# Patient Record
Sex: FEMALE | Race: WHITE | NOT HISPANIC OR LATINO | URBAN - NONMETROPOLITAN AREA
[De-identification: names, ages, dates, MRNs, and addresses within clinical notes are randomized per-mention and may not be internally consistent; named-entity substitution may affect disease eponyms.]

---

## 2024-06-26 ENCOUNTER — OFFICE (OUTPATIENT)
Dept: URBAN - NONMETROPOLITAN AREA CLINIC 1 | Facility: CLINIC | Age: 59
End: 2024-06-26
Payer: COMMERCIAL

## 2024-06-26 VITALS
DIASTOLIC BLOOD PRESSURE: 80 MMHG | HEART RATE: 61 BPM | SYSTOLIC BLOOD PRESSURE: 122 MMHG | WEIGHT: 172 LBS | HEIGHT: 63 IN

## 2024-06-26 DIAGNOSIS — K76.0 FATTY (CHANGE OF) LIVER, NOT ELSEWHERE CLASSIFIED: ICD-10-CM

## 2024-06-26 DIAGNOSIS — R79.89 OTHER SPECIFIED ABNORMAL FINDINGS OF BLOOD CHEMISTRY: ICD-10-CM

## 2024-06-26 DIAGNOSIS — K86.2 CYST OF PANCREAS: ICD-10-CM

## 2024-06-26 DIAGNOSIS — Z80.0 FAMILY HISTORY OF MALIGNANT NEOPLASM OF DIGESTIVE ORGANS: ICD-10-CM

## 2024-06-26 PROCEDURE — 36415 COLL VENOUS BLD VENIPUNCTURE: CPT | Performed by: NURSE PRACTITIONER

## 2024-06-26 PROCEDURE — 99204 OFFICE O/P NEW MOD 45 MIN: CPT | Performed by: NURSE PRACTITIONER

## 2024-07-01 ENCOUNTER — OFFICE (OUTPATIENT)
Dept: URBAN - NONMETROPOLITAN AREA CLINIC 1 | Facility: CLINIC | Age: 59
End: 2024-07-01

## 2024-07-01 VITALS — HEIGHT: 63 IN

## 2024-07-01 DIAGNOSIS — K76.0 FATTY (CHANGE OF) LIVER, NOT ELSEWHERE CLASSIFIED: ICD-10-CM

## 2024-07-01 DIAGNOSIS — R94.5 ABNORMAL RESULTS OF LIVER FUNCTION STUDIES: ICD-10-CM

## 2024-07-01 PROCEDURE — 76705 ECHO EXAM OF ABDOMEN: CPT | Mod: TC | Performed by: NURSE PRACTITIONER

## 2024-10-02 PROBLEM — K63.5 POLYP OF COLON: Status: ACTIVE | Noted: 2024-10-02

## 2024-10-02 PROBLEM — Z12.11 SCREENING FOR COLONIC NEOPLASIA: Status: ACTIVE | Noted: 2024-10-02

## 2024-12-13 ENCOUNTER — OFFICE (OUTPATIENT)
Dept: URBAN - NONMETROPOLITAN AREA CLINIC 1 | Facility: CLINIC | Age: 59
End: 2024-12-13

## 2024-12-13 VITALS — HEIGHT: 63 IN

## 2024-12-13 DIAGNOSIS — K76.0 FATTY (CHANGE OF) LIVER, NOT ELSEWHERE CLASSIFIED: ICD-10-CM

## 2024-12-13 PROCEDURE — 76981 USE PARENCHYMA: CPT | Performed by: NURSE PRACTITIONER

## 2025-03-18 ENCOUNTER — OFFICE (OUTPATIENT)
Dept: URBAN - NONMETROPOLITAN AREA CLINIC 1 | Facility: CLINIC | Age: 60
End: 2025-03-18
Payer: MEDICARE

## 2025-03-18 VITALS
DIASTOLIC BLOOD PRESSURE: 66 MMHG | HEIGHT: 63 IN | WEIGHT: 170 LBS | HEART RATE: 72 BPM | SYSTOLIC BLOOD PRESSURE: 108 MMHG

## 2025-03-18 DIAGNOSIS — K76.0 FATTY (CHANGE OF) LIVER, NOT ELSEWHERE CLASSIFIED: ICD-10-CM

## 2025-03-18 DIAGNOSIS — K86.2 CYST OF PANCREAS: ICD-10-CM

## 2025-03-18 DIAGNOSIS — K74.00 HEPATIC FIBROSIS, UNSPECIFIED: ICD-10-CM

## 2025-03-18 DIAGNOSIS — Z86.0101 PERSONAL HISTORY OF ADENOMATOUS AND SERRATED COLON POLYPS: ICD-10-CM

## 2025-03-18 DIAGNOSIS — R79.89 OTHER SPECIFIED ABNORMAL FINDINGS OF BLOOD CHEMISTRY: ICD-10-CM

## 2025-03-18 DIAGNOSIS — Z09 ENCOUNTER FOR FOLLOW-UP EXAMINATION AFTER COMPLETED TREATMEN: ICD-10-CM

## 2025-03-18 PROCEDURE — 99214 OFFICE O/P EST MOD 30 MIN: CPT | Performed by: NURSE PRACTITIONER

## 2025-03-18 NOTE — SERVICEHPINOTES
Patient presents to the clinic today for follow up of hepatic steatosis.  She was evaluated last by DAJA Watts 12/2024 and workup has included Abd US 7/2024 hepatic steatosis, no liver lesion, CBD 3.8mm, mildly elevated LFTS AST 44, ALT 65, and Fibroscan 12/24- S3 advance steatosis. F2 moderate scarring. IQR 10%.  She did have a CT AP in 2023 that did reveal a pancreatic cystic mass in which f/u CT AP 12/2024 showed stable pacreatic cyst with recommendations to repeat imaging in 1 year for surveillance.  She denies a history of pancreatitis, family history of pancreatic cancer.  She has not lost any weight since her last office visit, but she avoid fried/fatty foods and does not consume much sugary foods.  She denies abdominal pain, n/v/d, unintentional weight loss, melena, hematochezia, or fever.  She has a decent appetite.  Denies NSAID use or ETOH use.  br
br CT AP 12/2024
brhepatic steatosis. 12x9mm (previously 15x 15mm). No pancreatic ductal dilatation. br br Fibroscan 12/24-S3F2
br labs 12/24- AST 44, ALT 65br
br   Per OV DAJA Watts 12/2024
br59 year old female presents for fu. I last saw the pt in June 2024. At LOV she was referred for abnormal liver function testing. Per chart reveal imaging was found with concerns for hepatic steatosis and small pancreatic cyst. Upon further discussion she was see a dr hudson out of [place] who was following the pancreatic cyst. She had last imaging of the pancreas with a CT in jan 2024 and was advised 1 year fu. At LOV she noted she would not be able to see dr hudson due to insurance changes. Since lov she had fu liver testing and ultrasound d/t elevated lfts. Labs revealed mildly elevated ALT and prediabetic labs. I advised fu imaging and fibroscan as well. She also had her colonoscopy and was advised 3 year repeat. she is with her  today in the clinic.pt reports hx of elevated lfts told first sometime early 2024. she also had some abnormal thyroid test and was started on medication. she reports fu labs in may 2024 and thyroid had normalized but believes lfts had increased. I do not have the original labs from earlier this year for comparison. she denies known hx of liver disease but when asked about a ct from 2023 and mention of fatty liver she repots being aware of this. denies hx of hepatitis, illicit drugs, or heavy alcohol use. she notes she drank socially until 2008. she denies current tobacco use. denies jaundice or weight loss. she reports hx of stroke in 2009. she notes being on oxy for chronic pain since the stroke. notes nsaids around 5x month for headaches. LOV Labs were c/w pre diabetes. Denies changing diet or life style change. she reports at this time she has done fine since the colonoscopy. she is having a bmqd. denies rectal pain bleeding or blood in the stool. denies abdominal pain or weight loss unintentionally. she denies hb reflux nv dysphagia. 05/15/2024 thyroid panel normal. CBC unremarkable. Creatinine elevated at 1.01. AST 46, ALT 63. Otherwise CMP within normal limitsbr6/2024 ALT 39. total cholesterol 159, trig 121, hdl 47, ldl 90. acute hep panel neg. ferritin 202, iron levels wnl. pt/inr wnl. hgb a1c 6%. hiv non reactive. ggt and ceruloplasmin wnl.gi history:2/22/23 CT a/p w-IMPRESSION:br1. No renal or ureteral calculus or ancillary evidence of recentlybrpassed calculi. No renal or bladder mass is demonstrated.br2. Cystic mass within the body of the pancreas measuring 15 x 15 mm.brConsider pancreatic protocol MRI or a follow-up pancreatic CT in 3-6brmonths be obtained to ensure size stability.br3. Diffuse hepatic steatosis with tiny right hepatic hypodensity, toobrsmall to further characterize.Medical records [place] report pt no showed for mri. 1/29/2024 ct a/p w- there is small pancreatic cyst in body of pancreas measuring up to about 8.6mm. fu in 1 year recommended. fatty liver. prior hysterectomy.7/2/24 US- Hepatic steatosis. No liver lesion noted. CBD 3.8mm. Limited view of pancreas d/t bowel gas shadowing. No lesion seen due to shadowing. Recommend ct or mri to evaluate [id number]/2/24 colonoscopy by dr pérez- repeat in 3 years.

## 2025-03-18 NOTE — SERVICENOTES
Will obtain labs for hepatic steatosis today-keep in mind she is not fasting
Will be due pancreatic lesion surveillance 12/2025 and will obtain MRI/MRCP w/ and w/o at this time- discussed with her in office.
Continue to follow Mediterranean diet, control metabolic risk factors, and lose 10% body weight over the next 6-8 months
Will trial Rezdiffra for hepatic steatosis and hepatic fibrosis outlined above-discussed with patient in office today
Avoid ETOH and NSAIDs
F/u in 6 months.
Surveillance colonoscopy due 2027.

## 2025-03-19 RX ORDER — RESMETIROM 80 MG/1
80 TABLET, COATED ORAL
Qty: 90 | Refills: 2 | Status: ACTIVE
Start: 2025-03-19